# Patient Record
Sex: FEMALE | Race: WHITE | NOT HISPANIC OR LATINO | URBAN - METROPOLITAN AREA
[De-identification: names, ages, dates, MRNs, and addresses within clinical notes are randomized per-mention and may not be internally consistent; named-entity substitution may affect disease eponyms.]

---

## 2018-05-03 ENCOUNTER — OFFICE (OUTPATIENT)
Dept: URBAN - METROPOLITAN AREA CLINIC 78 | Facility: CLINIC | Age: 43
End: 2018-05-03

## 2018-05-03 VITALS
TEMPERATURE: 97.8 F | DIASTOLIC BLOOD PRESSURE: 69 MMHG | HEART RATE: 85 BPM | HEIGHT: 70 IN | WEIGHT: 189 LBS | SYSTOLIC BLOOD PRESSURE: 105 MMHG

## 2018-05-03 DIAGNOSIS — R52 PAIN, UNSPECIFIED: ICD-10-CM

## 2018-05-03 DIAGNOSIS — R53.83 OTHER FATIGUE: ICD-10-CM

## 2018-05-03 DIAGNOSIS — Z20.5 CONTACT WITH AND (SUSPECTED) EXPOSURE TO VIRAL HEPATITIS: ICD-10-CM

## 2018-05-03 DIAGNOSIS — K82.9 DISEASE OF GALLBLADDER, UNSPECIFIED: ICD-10-CM

## 2018-05-03 PROCEDURE — 99204 OFFICE O/P NEW MOD 45 MIN: CPT

## 2018-05-03 NOTE — SERVICEHPINOTES
43 yo female presents with concern about distended GB- apparently noted on recent MRI ordered by her spinal surgeon. Patient has PMH of polysubstance abuse, bipolar disorder, ITP (s/p splenectomy), positive lupus anticoagulant, and chronic pain. She was seen here in 2012 for hepatitis. Has h/o hep C but viral level was negative in past. Hepatitis B viral load came back very high in past. A liver biopsy was done showing grade 3 inflammation but no fibrosis. She states it has been years since she has used any IV or intranasal drugs. She has some chronic bloating and irregularity of bowel habits. She had an EGD and colonoscopy here in 2013 for this. No N/V or blood in stools. She notes that probiotics have been helpful. Notes a lot of fatigue recently.1/26/2018 AST/ALT 14/11, Hep C ab 23.4, HIV ag/ab negBR-------BR11/20/12 AST//918, alk phos 198, bili 0.6, alb 3.4BR11/18/12 AST//702, bili 0.5, alb 3.1, WBC 9.2, H/H 14.8/43.1, plt 501YG14/15/12 TSH 0.814CK91/14/12 urine pos for opiates, PCP, mtigujdlFO84/8/12 AFP 3.4BR10/5/12 HBV DNA >170 mill IU/mlBR10/3/12 HCV RNA neg, cerulo wnl, A1AT wnl, KELBY neg, AMA neg, actin ab pos, LKMab negBR10/1/12 HBsAg pos, sAb neg, Hep A IgM neg, Hep C ab pos

## 2020-01-15 ENCOUNTER — OFFICE (OUTPATIENT)
Dept: URBAN - METROPOLITAN AREA CLINIC 78 | Facility: CLINIC | Age: 45
End: 2020-01-15

## 2020-01-15 VITALS
TEMPERATURE: 97.7 F | HEIGHT: 70 IN | SYSTOLIC BLOOD PRESSURE: 135 MMHG | DIASTOLIC BLOOD PRESSURE: 86 MMHG | WEIGHT: 159 LBS

## 2020-01-15 DIAGNOSIS — R52 PAIN, UNSPECIFIED: ICD-10-CM

## 2020-01-15 DIAGNOSIS — Z20.5 CONTACT WITH AND (SUSPECTED) EXPOSURE TO VIRAL HEPATITIS: ICD-10-CM

## 2020-01-15 DIAGNOSIS — R10.12 LEFT UPPER QUADRANT PAIN: ICD-10-CM

## 2020-01-15 PROCEDURE — 99214 OFFICE O/P EST MOD 30 MIN: CPT

## 2020-01-15 RX ORDER — OMEPRAZOLE 40 MG/1
CAPSULE, DELAYED RELEASE ORAL
Qty: 0 | Refills: 0 | Status: ACTIVE

## 2020-01-15 RX ORDER — ONDANSETRON HYDROCHLORIDE 4 MG/1
TABLET, FILM COATED ORAL
Qty: 30 | Refills: 4 | Status: ACTIVE
Start: 2020-01-15

## 2020-01-15 NOTE — SERVICEHPINOTES
45 yo female presents with LUQ pain. Patient has PMH of polysubstance abuse, bipolar disorder, ITP (s/p splenectomy), positive lupus anticoagulant, and chronic pain. Seen here in past for hepatitis concerns (see history below). States her LUQ pain has been going on for 5 years. Reports severe pain which is worse with eating. She has had intermittent episodes of this pain but not as severe or long-lasting as current issues. In the past, episodes have lasted up to 12 hours and tend to resolve on their own. Currently she has been having more pain on a daily basis for the past 2 weeks. Zofran and shukri-based products can be helpful. She has an old Zofran script on hand and has been using 1/2 pill 1-2x/day which helps with nausea and pain. Hasn't tried any acid-reducing meds as she doesn't have reflux symptoms. Reports black stools. Stools have been harder than normal. She is on multiple pain and psychotropic medications. Prior hx: She was seen here in 2012 for hepatitis. Has h/o hep C but viral level was negative in past. Hepatitis B viral load came back very high in past. A liver biopsy was done showing grade 3 inflammation but no fibrosis. She states it has been years since she has used any IV or intranasal drugs. She has h/o bloating and irregularity of bowel habits. She had an EGD and colonoscopy here in 2013 for this. She was seen here again in 2018 with concern about distended GB- apparently noted on MRI ordered by her spinal surgeon. She failed to f/u after that. 1/26/2018 AST/ALT 14/11, Hep C ab 23.4, HIV ag/ab negBR-------BR11/20/12 AST//918, alk phos 198, bili 0.6, alb 3.4BR11/18/12 AST//702, bili 0.5, alb 3.1, WBC 9.2, H/H 14.8/43.1, plt 668VU19/15/12 TSH 0.269AD50/14/12 urine pos for opiates, PCP, yyjxboukPH74/8/12 AFP 3.4BR10/5/12 HBV DNA >170 mill IU/mlBR10/3/12 HCV RNA neg, cerulo wnl, A1AT wnl, KELBY neg, AMA neg, actin ab pos, LKMab negBR10/1/12 HBsAg pos, sAb neg, Hep A IgM neg, Hep C ab pos

## 2020-01-21 ENCOUNTER — ON CAMPUS - OUTPATIENT (OUTPATIENT)
Dept: URBAN - METROPOLITAN AREA HOSPITAL 14 | Facility: HOSPITAL | Age: 45
End: 2020-01-21
Payer: COMMERCIAL

## 2020-01-21 DIAGNOSIS — Z53.8 PROCEDURE AND TREATMENT NOT CARRIED OUT FOR OTHER REASONS: ICD-10-CM

## 2020-01-21 DIAGNOSIS — R10.12 LEFT UPPER QUADRANT PAIN: ICD-10-CM

## 2020-01-21 PROCEDURE — 43235 EGD DIAGNOSTIC BRUSH WASH: CPT | Mod: 53

## 2020-02-21 ENCOUNTER — OFFICE (OUTPATIENT)
Dept: URBAN - METROPOLITAN AREA CLINIC 78 | Facility: CLINIC | Age: 45
End: 2020-02-21

## 2020-02-21 VITALS
SYSTOLIC BLOOD PRESSURE: 123 MMHG | HEIGHT: 70 IN | HEART RATE: 80 BPM | TEMPERATURE: 97.9 F | WEIGHT: 164 LBS | DIASTOLIC BLOOD PRESSURE: 76 MMHG

## 2020-02-21 DIAGNOSIS — R10.12 LEFT UPPER QUADRANT PAIN: ICD-10-CM

## 2020-02-21 PROCEDURE — 99214 OFFICE O/P EST MOD 30 MIN: CPT

## 2020-02-21 RX ORDER — DICYCLOMINE HYDROCHLORIDE 20 MG/1
TABLET ORAL
Qty: 0 | Refills: 0 | Status: ACTIVE

## 2020-03-09 ENCOUNTER — ON CAMPUS - OUTPATIENT (OUTPATIENT)
Dept: URBAN - METROPOLITAN AREA HOSPITAL 16 | Facility: HOSPITAL | Age: 45
End: 2020-03-09
Payer: COMMERCIAL

## 2020-03-09 DIAGNOSIS — R10.12 LEFT UPPER QUADRANT PAIN: ICD-10-CM

## 2020-03-09 DIAGNOSIS — K29.60 OTHER GASTRITIS WITHOUT BLEEDING: ICD-10-CM

## 2020-03-09 PROCEDURE — 43239 EGD BIOPSY SINGLE/MULTIPLE: CPT | Performed by: INTERNAL MEDICINE
